# Patient Record
Sex: MALE | Race: OTHER | HISPANIC OR LATINO | Employment: FULL TIME | URBAN - METROPOLITAN AREA
[De-identification: names, ages, dates, MRNs, and addresses within clinical notes are randomized per-mention and may not be internally consistent; named-entity substitution may affect disease eponyms.]

---

## 2021-01-19 ENCOUNTER — HOSPITAL ENCOUNTER (EMERGENCY)
Facility: HOSPITAL | Age: 36
Discharge: HOME | End: 2021-01-19
Attending: EMERGENCY MEDICINE | Admitting: EMERGENCY MEDICINE
Payer: COMMERCIAL

## 2021-01-19 ENCOUNTER — APPOINTMENT (EMERGENCY)
Dept: RADIOLOGY | Facility: HOSPITAL | Age: 36
End: 2021-01-19
Payer: COMMERCIAL

## 2021-01-19 VITALS
WEIGHT: 280.87 LBS | TEMPERATURE: 97.8 F | SYSTOLIC BLOOD PRESSURE: 156 MMHG | HEART RATE: 68 BPM | OXYGEN SATURATION: 97 % | DIASTOLIC BLOOD PRESSURE: 97 MMHG | RESPIRATION RATE: 16 BRPM

## 2021-01-19 DIAGNOSIS — S93.409A ANKLE SPRAIN: Primary | ICD-10-CM

## 2021-01-19 PROCEDURE — 73610 X-RAY EXAM OF ANKLE: CPT

## 2021-01-19 PROCEDURE — 99282 EMERGENCY DEPT VISIT SF MDM: CPT | Performed by: EMERGENCY MEDICINE

## 2021-01-19 PROCEDURE — 99284 EMERGENCY DEPT VISIT MOD MDM: CPT

## 2021-01-19 RX ORDER — ACETAMINOPHEN 325 MG/1
975 TABLET ORAL ONCE
Status: COMPLETED | OUTPATIENT
Start: 2021-01-19 | End: 2021-01-19

## 2021-01-19 RX ORDER — LISINOPRIL 20 MG/1
20 TABLET ORAL DAILY
COMMUNITY
End: 2021-10-05 | Stop reason: SDUPTHER

## 2021-01-19 RX ADMIN — ACETAMINOPHEN 975 MG: 325 TABLET ORAL at 09:36

## 2021-01-19 NOTE — ED PROVIDER NOTES
History  Chief Complaint   Patient presents with    Ankle Pain     twisted left ankle this morning     31-year-old gentleman presents with complaint of left ankle pain  He states he was walking up onto a sidewalk and twisted his left ankle  He has been unable to bear weight secondary to pain  He denies any previous injuries to this ankle and has taken no medications for his pain symptoms  Denies any weakness or numbness  He also denies any injury secondary to falling after twisting his ankle  Ankle Pain  Location:  Ankle  Injury: yes    Mechanism of injury: fall    Fall:     Fall occurred:  Walking  Ankle location:  L ankle  Pain details:     Quality:  Aching and dull    Radiates to:  Does not radiate    Severity:  Severe    Onset quality:  Sudden    Duration: minutes  Timing:  Constant    Progression:  Unchanged  Chronicity:  New  Dislocation: no    Prior injury to area:  No  Relieved by:  Nothing  Worsened by:  Bearing weight  Ineffective treatments:  None tried  Associated symptoms: decreased ROM (due to pain) and stiffness    Associated symptoms: no back pain, no fatigue, no fever, no itching, no muscle weakness, no neck pain, no numbness, no swelling and no tingling        Prior to Admission Medications   Prescriptions Last Dose Informant Patient Reported? Taking?   lisinopril (ZESTRIL) 20 mg tablet   Yes Yes   Sig: Take 20 mg by mouth daily      Facility-Administered Medications: None       Past Medical History:   Diagnosis Date    Hypertension        Past Surgical History:   Procedure Laterality Date    VASECTOMY      2019       History reviewed  No pertinent family history  I have reviewed and agree with the history as documented      E-Cigarette/Vaping    E-Cigarette Use Never User      E-Cigarette/Vaping Substances     Social History     Tobacco Use    Smoking status: Never Smoker    Smokeless tobacco: Never Used   Substance Use Topics    Alcohol use: Never     Frequency: Never    Drug use: Never       Review of Systems   Constitutional: Negative  Negative for fatigue and fever  Respiratory: Negative  Gastrointestinal: Negative  Musculoskeletal: Positive for arthralgias (left ankle), gait problem and stiffness  Negative for back pain, joint swelling and neck pain  Skin: Negative for color change, itching and wound  Neurological: Negative for weakness  Hematological: Does not bruise/bleed easily  Physical Exam  Physical Exam  Vitals signs and nursing note reviewed  Constitutional:       General: She is in acute distress (appears uncomfortable)  Appearance: Normal appearance  She is well-developed  She is not ill-appearing or toxic-appearing  HENT:      Head: Normocephalic and atraumatic  Pulmonary:      Effort: Pulmonary effort is normal  No respiratory distress  Musculoskeletal:      Left ankle: Tenderness  Lateral malleolus and AITFL tenderness found  No medial malleolus, no head of 5th metatarsal and no proximal fibula tenderness found  Skin:     General: Skin is warm and dry  Capillary Refill: Capillary refill takes less than 2 seconds  Neurological:      General: No focal deficit present  Mental Status: She is alert  Sensory: No sensory deficit     Psychiatric:         Mood and Affect: Mood normal          Behavior: Behavior normal          Vital Signs  ED Triage Vitals [01/19/21 0912]   Temperature Pulse Respirations Blood Pressure SpO2   97 8 °F (36 6 °C) 78 18 (!) 174/115 100 %      Temp Source Heart Rate Source Patient Position - Orthostatic VS BP Location FiO2 (%)   Oral Monitor Sitting Left arm --      Pain Score       7           Vitals:    01/19/21 0912 01/19/21 1027   BP: (!) 174/115 156/97   Pulse: 78 68   Patient Position - Orthostatic VS: Sitting Lying         Visual Acuity      ED Medications  Medications   acetaminophen (TYLENOL) tablet 975 mg (975 mg Oral Given 1/19/21 0936)       Diagnostic Studies  Results Reviewed     None XR ankle 3+ views LEFT    (Results Pending)              Procedures  Procedures         ED Course                             SBIRT 22yo+      Most Recent Value   SBIRT (24 yo +)   In order to provide better care to our patients, we are screening all of our patients for alcohol and drug use  Would it be okay to ask you these screening questions? Yes Filed at: 01/19/2021 2936   Initial Alcohol Screen: US AUDIT-C    1  How often do you have a drink containing alcohol?  0 Filed at: 01/19/2021 0919   2  How many drinks containing alcohol do you have on a typical day you are drinking? 0 Filed at: 01/19/2021 0919   3a  Male UNDER 65: How often do you have five or more drinks on one occasion? 0 Filed at: 01/19/2021 0919   3b  FEMALE Any Age, or MALE 65+: How often do you have 4 or more drinks on one occassion? 0 Filed at: 01/19/2021 0919   Audit-C Score  0 Filed at: 01/19/2021 5987   ANNAMARIE: How many times in the past year have you    Used an illegal drug or used a prescription medication for non-medical reasons? Never Filed at: 01/19/2021 0747                    MDM    Disposition  Final diagnoses: Ankle sprain     Time reflects when diagnosis was documented in both MDM as applicable and the Disposition within this note     Time User Action Codes Description Comment    1/19/2021 10:21 AM Mimi Patel [[id number]] Ankle sprain       ED Disposition     ED Disposition Condition Date/Time Comment    Discharge Stable Tue Jan 19, 2021 10:21 AM Vidhya April discharge to home/self care              Follow-up Information     Follow up With Specialties Details Why Contact Info    Janes Landis DPM Podiatry, Wound Care  If symptoms worsen [address]  [address] [facility] [address]  [phone]            Discharge Medication List as of 1/19/2021 10:22 AM      CONTINUE these medications which have NOT CHANGED    Details   lisinopril (ZESTRIL) 20 mg tablet Take 20 mg by mouth daily, Historical Med           No discharge procedures on file      PDMP Review     None          ED Provider  Electronically Signed by           Raya Dorsey DO  01/19/21 8358

## 2021-01-19 NOTE — Clinical Note
Madhavi Luong was seen and treated in our emergency department on 1/19/2021  Diagnosis:     Ilya Nolan  may return to work on return date  He may return on this date: 01/21/2021         If you have any questions or concerns, please don't hesitate to call        Coleen Sarah DO    ______________________________           _______________          _______________  Hospital Representative                              Date                                Time

## 2021-07-30 ENCOUNTER — OFFICE VISIT (OUTPATIENT)
Dept: FAMILY MEDICINE CLINIC | Facility: CLINIC | Age: 36
End: 2021-07-30

## 2021-07-30 VITALS
TEMPERATURE: 96.9 F | DIASTOLIC BLOOD PRESSURE: 90 MMHG | HEART RATE: 86 BPM | RESPIRATION RATE: 16 BRPM | BODY MASS INDEX: 35.36 KG/M2 | WEIGHT: 247 LBS | OXYGEN SATURATION: 98 % | SYSTOLIC BLOOD PRESSURE: 130 MMHG | HEIGHT: 70 IN

## 2021-07-30 DIAGNOSIS — Z00.00 ANNUAL PHYSICAL EXAM: Primary | ICD-10-CM

## 2021-07-30 DIAGNOSIS — M54.50 ACUTE LEFT-SIDED LOW BACK PAIN WITHOUT SCIATICA: ICD-10-CM

## 2021-07-30 DIAGNOSIS — Z76.89 ENCOUNTER TO ESTABLISH CARE: ICD-10-CM

## 2021-07-30 PROCEDURE — 99385 PREV VISIT NEW AGE 18-39: CPT | Performed by: NURSE PRACTITIONER

## 2021-07-30 RX ORDER — CHLORTHALIDONE 25 MG/1
25 TABLET ORAL DAILY
COMMUNITY

## 2021-07-30 RX ORDER — TOPIRAMATE 25 MG/1
25 TABLET ORAL 2 TIMES DAILY
COMMUNITY
Start: 2021-07-08

## 2021-07-30 RX ORDER — PHENTERMINE HYDROCHLORIDE 37.5 MG/1
37.5 CAPSULE ORAL EVERY MORNING
COMMUNITY

## 2021-07-30 RX ORDER — CHOLECALCIFEROL (VITAMIN D3) 1250 MCG
1 CAPSULE ORAL WEEKLY
COMMUNITY
Start: 2021-04-29 | End: 2021-10-05

## 2021-07-30 NOTE — LETTER
July 30, 2021     Patient: Augusto Fernández   YOB: 1985   Date of Visit: 7/30/2021       To Whom it May Concern:    Augusto Fernández is under my professional care  He was seen in my office on 7/30/2021  He may return to work on 07/30/2021  If you have any questions or concerns, please don't hesitate to call           Sincerely,          PEDRO Brown        CC: No Recipients

## 2021-07-30 NOTE — PATIENT INSTRUCTIONS
Plan de alimentación con "enfoque dietético para detener la hipertensión (DASH, por hugo siglas en inglés)   LO QUE NECESITA SABER:   El plan de alimentación DASH está diseñado para ayudar a prevenir o disminuir la hipertensión  También puede ayudar a bajar el colesterol lamonte (colesterol LDL) y disminuir camp riesgo de enfermedad cardíaca  El plan es bajo en sodio, azúcar, grasas dañinas, y grasas en camp totalidad  Es alto en potasio, calcio, magnesio y [place]  Estos nutrientes se agregan al consumir más frutas, vegetales y granos enteros  INSTRUCCIONES SOBRE EL MAL HOSPITALARIA:   Camp límite de sodio cada día: Camp dietista le indicará la cantidad de sodio que usted debe consumir a diario  La gente que tiene la presión arterial mal debe consumir de 1,500 a 2,300 mg de sodio al día renea corry  Faraz cucharadita (cdta) de sal tiene 2,300 mg de sodio  [place] puede parecer renea faraz meta difícil, mattie pequeños cambios en los alimentos que usted consume pueden hacer faraz gran diferencia  Camp médico o dietista puede ayudarlo a crear un plan alimenticio que cumpla camp límite de sodio  Formas de limitar el sodio:  · Darby las etiquetas de los alimentos  Las etiquetas pueden ayudarle a escoger alimentos bajos en sodio  La cantidad de sodio está incluida en miligramos (mg)  La columna del porcentaje de valor diario indica la cantidad de necesidades diarias satisfechas con 1 porción del alimento para cada nutriente en la lista  Escoja alimentos que tengan menos de 5% del porcentaje diario de Herbert  Estos alimentos se consideran bajos en sodio  Los alimentos que tienen 20% o más del porcentaje diario de sodio se consideran alimentos altos en sodio  Evite alimentos que tengan más de 300 mg de sodio por porción  Escoja alimentos Derick Gist diga que son bajos en sodio, con sodio reducido, o sin sal agregada           · Evite la sal  No le agregue sal a la comida cuando se siente a la ramirez a comer, y agregue muy poca sal a los alimentos fabio la cocción  Use hierbas y condimentos, renea cebollas, ajo y especias sin sal para agregar sabor a los alimentos  Use jugo de lima, kalyan o vinagre para darle a los alimentos un sabor ácido  Use chiles picantes o faraz cantidad pequeña de salsa picante para agregar un sabor picante a los alimentos  · Pregunte sobre los sustitutos para la sal  Pregúntele a camp médico si es posible usar sustitutos de la sal  Algunos sustitutos de la sal vienen con ingredientes que pueden ser dañinos si usted tiene ciertos padecimientos médicos  · Escoja los alimentos cuidadosamente cuando sale a comer a restaurantes: las comidas de los restaurantes, sobre todo restaurantes de comida rápida, rick siempre son altas en sodio  Algunos restaurantes ofrecen información nutricional que indica la cantidad de sodio en hugo alimentos  Pida que preparen hugo comidas con menos sal o sin sal     Lo que necesita saber acerca de las grasas:  · Incluya grasas saludables  Las grasas insaturadas y los ácidos grasos omega-3 son ejemplos de grasas saludables  Las grasas insaturadas se encuentran en los aceites de soja, can[place], [place] o [place] y la margarina [place]ier[place]re  y [facility]  Los ácidos grasos [place] 3 se encuentran en el pescado con grasa, renea el salmón, el atún, la caballa y las cindy  También se le puede encontrar en el aceita de linaza y en la linaza molida  · Evite las grasas insalubres  No consuma grasas dañinas, renea grasas saturadas y grasas trans  Las grasas saturadas se encuentran en alimentos que contienen grasa animal  Jesusita Polka son Merla Han grasosas, la [place], la [facility], la crema y otros productos lácteos  Además se pueden encontrar en la [place], la margarina en millie, el aceite de gonazles y el aceite de rosita  Las grasas trans se encuentran en comidas fritas, galletas estilo soda, tortillitas tostadas, y alimentos horneados hechos con Joseph Barter      Alimentos que puede incluir: Con el plan de alimentación DASH, usted necesita consumir un número específico de porciones de cada zoey de alimentos  Datto le ayudará a consumir las cantidades suficientes de ciertos nutrientes y limitar otros  La cantidad de porciones que usted debe comer depende de la cantidad de calorías que usted necesita  Basilio dietista le informará sobre cuántas calorías necesita usted  El número de porciones que viene en la lista al lado de los grupos alimenticios a continuación es para las personas que necesitan aproximadamente 2,000 calorías al día  · Granos: 6 a 8 porciones (3 de estas porciones deben ser de alimentos de granos enteros o integrales)    ? 1 tajada de pan integral    ? 1 onza de cereal seco    ? ½ taza de cereal cocinado, pasta, o arroz integral    · Verduras y frutas: 4 a 5 porciones de frutas y 4 a 5 porciones de vegetales    ? 1 fruta mediana    ? ½ taza de vegetales congelados, enlatados (sin sal adicional), o vegetales frescos y picados    ? ½ taza de fruta fresca, congelada, seca o enlatada (enlatada en sirope liviano o jugo de fruta)    ? ½ taza de jugo de verduras o frutas    · Productos lácteos: 2 a 3 porciones    ? [address] 1%    ? 1½ onzas de queso descremado o bajo en grasas    ? 6 onzas de yogurt descremado o bajo en grasas    · Bryson barber, bryson meghan y pescado magros: 6 onzas o menos    ? Bryson meghan (debbi, pavo) sin piel    ? Pescado (sobre todo pescado con grasa, renea salmón, atún fresco o BATRES)    ? Carne de res o de cerdo magra (Yareli Care extra [place])    ? Claras de huevo y sustitutos del huevo    · [place] du sac, semillas y legumbres: 4 a 5 porciones por semana    ? ½ taza de frijoles y arvejas cocinadas    ? 1½ onzas de nueces sin sal    ? 2 cucharadas de mantequilla de maní o semillas    · Dulces y azúcares adicionales: 5 o menos por semana    ? 1 cucharada de azúcar, jalea o mermelada    ? ½ taza de sorbeto o gelatina    ?  1 taza de limonada    · Grasas: 2 a 3 porciones por semana    ? 1 cucharadita de margarina suave o aceite vegetal    ? 1 cucharada de mayonesa    ? 2 cucharadas de aderezo para [facility] [employer] se deben evitar:  · Granos:     ? Postres horneados o fritos renea donas, pastelitos, galletas, y quequitos (altos en grasas y azúcar)    ? Mezclas para hacer pan de maíz y pasteles, alimentos empacados, renea rellenos para pavo, mezclas para hacer arroz y pasta, macarrones con Priyank-barre, y cereales instantáneos (altos en sodio)    · Rodrick Fairly y verduras:     ? Vegetales regulares enlatados (altos en sodio)    ? Repollo preparado en vinagre, vegetales en vinagre, y otros alimentos preparados en escabeche (altos en sodio)    ? Vegetales fritos o vegetales en mantequilla o salsas altas en grasas    ? [place] Fairly en crema o salsa de mantequilla (altas en grasas)    · Productos lácteos:     ? Leche entera, leche semi descremada (2%), y crema (abdon en grasas)    ? Queso regular y Priyank-barre procesado (alto en grasa y Herbert)    · Bryson y alimentos con proteínas:     ? Bryson ahumadas o curadas, renea carne de jadiel en conserva, tocino, jamón, perros calientes, y salchicha (abdon en grasas y [place])    ? Frijoles enlatados y bryson enlatadas o bryson en pasta, renea bryson en conserva, cindy, anchoas y mariscos de imitación (altos en sodio)    ? Bryson para emparedados, renea [place], [place], [place], y carne de res en rebanada (altos en sodio)    ? Bryson altas en grasas (biste estilo T-bone, carne molida para hamburguesas, costillas)    ? Huevos enteros y yemas de huevo (altos en grasas)    · Otros:     ? Condimentos hechos con sal, renea sal de ajo, sal de apio, sal de cebolla, sal condimentada, suavizantes para bryson, y glutamato de monosodio (MSG, por hugo siglas en inglés)    ? Sopa Miso y mezclas para sopa enlatadas o secas (altas en sodio)    ?  Salsa de soya regular, salsa de [address] [facility], salsa [place], salsa para biste, [address], y [address] con sabor (altas en sodio)    ? Condimentos regulares, renea mostaza, salsa de tomate y aderezos para ensalada (altos en sodio)    ? Salsa para ann y salsas en general, renea salsa Daniel o de queso (altas en sodio y [place])    ? Bebidas altas en azúcar, renea bebidas gaseosas o jugos de frutas    ? Alimentos para merendar, renea casie tostadas, palomitas de [place], pretzels, piel de cerdo, galletas de soda saladas, y nueces saladas    ? Alimentos congelados, renea cenas preparadas, platos principales, vegetales con salsas, y ann cubiertas en pan (altas en sodio)    Otras pautas que debe seguir:  · Mantenga un peso saludable  Camp riesgo de enfermedad cardíaca es aún más alto si usted tiene sobrepeso  Camp médico podría sugerirle que adelgace si tiene sobrepeso  Usted puede perder peso si se propone consumir menos calorías y alimentos que tengan azúcar y grasas agregadas  El plan de alimentación DASH puede ayudarle a lograrlo  [place] buena forma de disminuir el consumo de calorías es consumiendo porciones más pequeñas en cada comida y menos meriendas entre comidas  Consulte a acmp médico para obtener más información sobre cómo adelgazar  · Realice actividad física con regularidad  El ejercicio regular puede ayudarle a alcanzar o mantener un peso saludable  El ejercicio regular también puede ayudarle a disminuir camp presión arterial y mejorar hugo niveles de colesterol  Tevin ejercicios moderados por 30 minutos o más todos los días de la [place]  Para bajar peso, asegúrese de ejercitarse por lo menos 60 minutos  Consulte con camp médico sobre un programa de ejercicio adecuado para usted  · Limite el consumo de alcohol  Las mujeres deberían limitar el consumo de alcohol a 1 bebida por día  Los hombres deberían limitar el consumo de alcohol a 2 tragos al día  Un trago equivale a 12 onzas de cerveza, 5 onzas de vino o 1 onza y ½ de licor      © Copyright [employer] 2021 Information is for End User's use only and may not be sold, redistributed or otherwise used for commercial purposes  All illustrations and images included in CareNotes® are the copyrighted property of [employer]  or [address] [place] [address] es sólo para uso en educación  Camp intención no es darle un consejo médico sobre enfermedades o tratamientos  Colsulte con camp Ana Cables farmacéutico antes de seguir cualquier régimen médico para saber si es seguro y efectivo para usted  Wellness Visit for Adults   AMBULATORY CARE:   A wellness visit  is when you see your healthcare provider to get screened for health problems  Your healthcare provider will also give you advice on how to stay healthy  Write down your questions so you remember to ask them  Ask your healthcare provider how often you should have a wellness visit  What happens at a wellness visit:  Your healthcare provider will ask about your health, and your family history of health problems  This includes high blood pressure, heart disease, and cancer  He or she will ask if you have symptoms that concern you, if you smoke, and about your mood  You may also be asked about your intake of medicines, supplements, food, and alcohol  Any of the following may be done:  · Your weight  will be checked  Your height may also be checked so your body mass index (BMI) can be calculated  Your BMI shows if you are at a healthy weight  · Your blood pressure  and heart rate will be checked  Your temperature may also be checked  · Blood and urine tests  may be done  Blood tests may be done to check your cholesterol levels  Abnormal cholesterol levels increase your risk for heart disease and stroke  You may also need a blood or urine test to check for diabetes if you are at increased risk  Urine tests may be done to look for signs of an infection or kidney disease  · A physical exam  includes checking your heartbeat and lungs with a stethoscope   Your healthcare provider may also check your skin to look for sun damage  · Screening tests  may be recommended  A screening test is done to check for diseases that may not cause symptoms  The screening tests you may need depend on your age, gender, family history, and lifestyle habits  For example, colorectal screening may be recommended if you are 48years old or older  Screening tests you need if you are a woman:   · A Pap smear  is used to screen for cervical cancer  Pap smears are usually done every 3 to 5 years depending on your age  You may need them more often if you have had abnormal Pap smear test results in the past  Ask your healthcare provider how often you should have a Pap smear  · A mammogram  is an x-ray of your breasts to screen for breast cancer  Experts recommend mammograms every 2 years starting at age 48 years  You may need a mammogram at age 52 years or younger if you have an increased risk for breast cancer  Talk to your healthcare provider about when you should start having mammograms and how often you need them  Vaccines you may need:   · Get an influenza vaccine  every year  The influenza vaccine protects you from the flu  Several types of viruses cause the flu  The viruses change over time, so new vaccines are made each year  · Get a tetanus-diphtheria (Td) booster vaccine  every 10 years  This vaccine protects you against tetanus and diphtheria  Tetanus is a severe infection that may cause painful muscle spasms and lockjaw  Diphtheria is a severe bacterial infection that causes a thick covering in the back of your mouth and throat  · Get a human papillomavirus (HPV) vaccine  if you are female and aged 23 to 32 or male 23 to 24 and never received it  This vaccine protects you from HPV infection  HPV is the most common infection spread by sexual contact  HPV may also cause vaginal, penile, and anal cancers  · Get a pneumococcal vaccine  if you are aged 72 years or older   The pneumococcal vaccine is an injection given to protect you from pneumococcal disease  Pneumococcal disease is an infection caused by pneumococcal bacteria  The infection may cause pneumonia, meningitis, or an ear infection  · Get a shingles vaccine  if you are 60 or older, even if you have had shingles before  The shingles vaccine is an injection to protect you from the varicella-zoster virus  This is the same virus that causes chickenpox  Shingles is a painful rash that develops in people who had chickenpox or have been exposed to the virus  How to eat healthy:  My Plate is a model for planning healthy meals  It shows the types and amounts of foods that should go on your plate  Fruits and vegetables make up about half of your plate, and grains and protein make up the other half  A serving of dairy is included on the side of your plate  The amount of calories and serving sizes you need depends on your age, gender, weight, and height  Examples of healthy foods are listed below:  · Eat a variety of vegetables  such as dark green, red, and orange vegetables  You can also include canned vegetables low in sodium (salt) and frozen vegetables without added butter or sauces  · Eat a variety of fresh fruits , canned fruit in 100% juice, frozen fruit, and dried fruit  · Include whole grains  At least half of the grains you eat should be whole grains  Examples include whole-wheat bread, wheat pasta, brown rice, and whole-grain cereals such as oatmeal     · Eat a variety of protein foods such as seafood (fish and shellfish), lean meat, and poultry without skin (turkey and chicken)  Examples of lean meats include pork leg, shoulder, or tenderloin, and beef round, sirloin, tenderloin, and extra lean ground beef  Other protein foods include eggs and egg substitutes, beans, peas, soy products, nuts, and seeds  · Choose low-fat dairy products such as skim or 1% milk or low-fat yogurt, cheese, and cottage cheese      · Limit unhealthy fats  such as butter, hard margarine, and shortening  Exercise:  Exercise at least 30 minutes per day on most days of the week  Some examples of exercise include walking, biking, dancing, and swimming  You can also fit in more physical activity by taking the stairs instead of the elevator or parking farther away from stores  Include muscle strengthening activities 2 days each week  Regular exercise provides many health benefits  It helps you manage your weight, and decreases your risk for type 2 diabetes, heart disease, stroke, and high blood pressure  Exercise can also help improve your mood  Ask your healthcare provider about the best exercise plan for you  General health and safety guidelines:   · Do not smoke  Nicotine and other chemicals in cigarettes and cigars can cause lung damage  Ask your healthcare provider for information if you currently smoke and need help to quit  E-cigarettes or smokeless tobacco still contain nicotine  Talk to your healthcare provider before you use these products  · Limit alcohol  A drink of alcohol is 12 ounces of beer, 5 ounces of wine, or 1½ ounces of liquor  · Lose weight, if needed  Being overweight increases your risk of certain health conditions  These include heart disease, high blood pressure, type 2 diabetes, and certain types of cancer  · Protect your skin  Do not sunbathe or use tanning beds  Use sunscreen with a SPF 15 or higher  Apply sunscreen at least 15 minutes before you go outside  Reapply sunscreen every 2 hours  Wear protective clothing, hats, and sunglasses when you are outside  · Drive safely  Always wear your seatbelt  Make sure everyone in your car wears a seatbelt  A seatbelt can save your life if you are in an accident  Do not use your cell phone when you are driving  This could distract you and cause an accident  Pull over if you need to make a call or send a text message  · Practice safe sex    Use latex condoms if are sexually active and have more than one partner  Your healthcare provider may recommend screening tests for sexually transmitted infections (STIs)  · Wear helmets, lifejackets, and protective gear  Always wear a helmet when you ride a bike or motorcycle, go skiing, or play sports that could cause a head injury  Wear protective equipment when you play sports  Wear a lifejacket when you are on a boat or doing water sports  © Copyright [employer] 2021 Information is for End User's use only and may not be sold, redistributed or otherwise used for commercial purposes  All illustrations and images included in CareNotes® are the copyrighted property of [employer] D A M [employer] Inc  or [employer]   The above information is an  only  It is not intended as medical advice for individual conditions or treatments  Talk to your doctor, nurse or pharmacist before following any medical regimen to see if it is safe and effective for you  Cholesterol and Your Health   AMBULATORY CARE:   Cholesterol  is a waxy, fat-like substance  Your body uses cholesterol to make hormones and new cells, and to protect nerves  Cholesterol is made by your body  It also comes from certain foods you eat, such as meat and dairy products  Your healthcare provider can help you set goals for your cholesterol levels  He or she can help you create a plan to meet your goals  Cholesterol level goals: Your cholesterol level goals depend on your risk for heart disease, your age, and your other health conditions  The following are general guidelines:  · Total cholesterol  includes low-density lipoprotein (LDL), high-density lipoprotein (HDL), and triglyceride levels  The total cholesterol level should be lower than 200 mg/dL and is best at about 150 mg/dL  · LDL cholesterol  is called bad cholesterol  because it forms plaque in your arteries  As plaque builds up, your arteries become narrow, and less blood flows through   When plaque decreases blood flow to your heart, you may have chest pain  If plaque completely blocks an artery that brings blood to your heart, you may have a heart attack  Plaque can break off and form blood clots  Blood clots may block arteries in your brain and cause a stroke  The level should be less than 130 mg/dL and is best at about 100 mg/dL  · HDL cholesterol  is called good cholesterol  because it helps remove LDL cholesterol from your arteries  It does this by attaching to LDL cholesterol and carrying it to your liver  Your liver breaks down LDL cholesterol so your body can get rid of it  High levels of HDL cholesterol can help prevent a heart attack and stroke  Low levels of HDL cholesterol can increase your risk for heart disease, heart attack, and stroke  The level should be 60 mg/dL or higher  · Triglycerides  are a type of fat that store energy from foods you eat  High levels of triglycerides also cause plaque buildup  This can increase your risk for a heart attack or stroke  If your triglyceride level is high, your LDL cholesterol level may also be high  The level should be less than 150 mg/dL  Any of the following can increase your risk for high cholesterol:   · Smoking cigarettes    · Being overweight or obese, or not getting enough exercise    · Drinking large amounts of alcohol    · A medical condition such as hypertension (high blood pressure) or diabetes    · Certain genes passed from your parents to you    · Age older than 65 years    What you need to know about having your cholesterol levels checked: Adults 21to 39years of age should have their cholesterol levels checked every 4 to 6 years  Adults 45 years or older should have their cholesterol checked every 1 to 2 years  You may need your cholesterol checked more often, or at a younger age, if you have risk factors for heart disease  You may also need to have your cholesterol checked more often if you have other health conditions, such as diabetes   Blood tests are used to check cholesterol levels  Blood tests measure your levels of triglycerides, LDL cholesterol, and HDL cholesterol  How healthy fats affect your cholesterol levels:  Healthy fats, also called unsaturated fats, help lower LDL cholesterol and triglyceride levels  Healthy fats include the following:  · Monounsaturated fats  are found in foods such as olive oil, canola oil, avocado, nuts, and olives  · Polyunsaturated fats,  such as omega 3 fats, are found in fish, such as salmon, trout, and tuna  They can also be found in plant foods such as flaxseed, walnuts, and soybeans  How unhealthy fats affect your cholesterol levels:  Unhealthy fats increase LDL cholesterol and triglyceride levels  They are found in foods high in cholesterol, saturated fat, and trans fat:  · Cholesterol  is found in eggs, dairy, and meat  · Saturated fat  is found in butter, cheese, ice cream, whole milk, and coconut oil  Saturated fat is also found in meat, such as sausage, hot dogs, and bologna  · Trans fat  is found in liquid oils and is used in fried and baked foods  Foods that contain trans fats include chips, crackers, muffins, sweet rolls, microwave popcorn, and cookies  Treatment  for high cholesterol will also decrease your risk of heart disease, heart attack, and stroke  Treatment may include any of the following:  · Lifestyle changes  may include food, exercise, weight loss, and quitting smoking  You may also need to decrease the amount of alcohol you drink  Your healthcare provider will want you to start with lifestyle changes  Other treatment may be added if lifestyle changes are not enough  Your healthcare provider may recommend you work with a team to manage hyperlipidemia  The team may include medical experts such as a dietitian, an exercise or physical therapist, and a behavior therapist  Your family members may be included in helping you create lifestyle changes      · Medicines  may be given to lower your LDL cholesterol, triglyceride levels, or total cholesterol level  You may need medicines to lower your cholesterol if any of the following is true:    ? You have a history of stroke, TIA, unstable angina, or a heart attack  ? Your LDL cholesterol level is 190 mg/dL or higher  ? You are age 36 to 76 years, have diabetes or heart disease risk factors, and your LDL cholesterol is 70 mg/dL or higher  · Supplements  include fish oil, red yeast rice, and garlic  Fish oil may help lower your triglyceride and LDL cholesterol levels  It may also increase your HDL cholesterol level  Red yeast rice may help decrease your total cholesterol level and LDL cholesterol level  Garlic may help lower your total cholesterol level  Do not take any supplements without talking to your healthcare provider  Food changes you can make to lower your cholesterol levels:  A dietitian can help you create a healthy eating plan  He or she can show you how to read food labels and choose foods low in saturated fat, trans fats, and cholesterol  · Decrease the total amount of fat you eat  Choose lean meats, fat-free or 1% fat milk, and low-fat dairy products, such as yogurt and cheese  Try to limit or avoid red meats  Limit or do not eat fried foods or baked goods, such as cookies  · Replace unhealthy fats with healthy fats  Cook foods in olive oil or canola oil  Choose soft margarines that are low in saturated fat and trans fat  Seeds, nuts, and avocados are other examples of healthy fats  · Eat foods with omega-3 fats  Examples include salmon, tuna, mackerel, walnuts, and flaxseed  Eat fish 2 times per week  Pregnant women should not eat fish that have high levels of mercury, such as shark, swordfish, and kulwant mackerel  · Increase the amount of high-fiber foods you eat  High-fiber foods can help lower your LDL cholesterol  Aim to get between 20 and 30 grams of fiber each day  Fruits and vegetables are high in fiber   Eat at least 5 servings each day  Other high-fiber foods are whole-grain or whole-wheat breads, pastas, or cereals, and brown rice  Eat 3 ounces of whole-grain foods each day  Increase fiber slowly  You may have abdominal discomfort, bloating, and gas if you add fiber to your diet too quickly  · Eat healthy protein foods  Examples include low-fat dairy products, skinless chicken and turkey, fish, and nuts  · Limit foods and drinks that are high in sugar  Your dietitian or healthcare provider can help you create daily limits for high-sugar foods and drinks  The limit may be lower if you have diabetes or another health condition  Limits can also help you lose weight if needed  Lifestyle changes you can make to lower your cholesterol levels:   · Maintain a healthy weight  Ask your healthcare provider what a healthy weight is for you  Ask him or her to help you create a weight loss plan if needed  Weight loss can decrease your total cholesterol and triglyceride levels  Weight loss may also help keep your blood pressure at a healthy level  · Be physically active throughout the day  Physical activity, such as exercise, can help lower your total cholesterol level and maintain a healthy weight  Physical activity can also help increase your HDL cholesterol level  Work with your healthcare provider to create an program that is right for you  Get at least 30 to 40 minutes of moderate physical activity most days of the week  Examples of exercise include brisk walking, swimming, or biking  Also include strength training at least 2 times each week  Your healthcare providers can help you create a physical activity plan  · Do not smoke  Nicotine and other chemicals in cigarettes and cigars can raise your cholesterol levels  Ask your healthcare provider for information if you currently smoke and need help to quit  E-cigarettes or smokeless tobacco still contain nicotine   Talk to your healthcare provider before you use these products  · Limit or do not drink alcohol  Alcohol can increase your triglyceride levels  Ask your healthcare provider before you drink alcohol  Ask how much is okay for you to drink in 24 hours or 1 week  Follow up with your doctor as directed:  Write down your questions so you remember to ask them during your visits  © Copyright [employer] 2021 Information is for End User's use only and may not be sold, redistributed or otherwise used for commercial purposes  All illustrations and images included in CareNotes® are the copyrighted property of [employer]  or Be Hodges   The above information is an  only  It is not intended as medical advice for individual conditions or treatments  Talk to your doctor, nurse or pharmacist before following any medical regimen to see if it is safe and effective for you  Dieta con alto contenido de Prole   LO QUE NECESITA SABER:   Aureliano Six dieta con alto contenido de fibra incluye alimentos con faraz mal cantidad de fibra  La fibra es la parte de las frutas, los vegetales y los granos, que no se descompone al ser ingerida por camp cuerpo  La fibra ayuda a regular las evacuaciones intestinales  La fibra además ayuda a bajar el colesterol, controlar la glucosa en la johana en las personas que sufren de diabetes y para aliviar el estreñimiento  También la fibra podría ayudarle a controlar camp peso debido a que le da la sensación de llenura más rápidamente  La mayoría de los adultos deberían consumir entre 25 a 35 gramos de fibra al día  Consulte con camp dietista o médico sobre la cantidad de fibra que usted necesita  INSTRUCCIONES SOBRE EL MAL HOSPITALARIA:   Buenas severino de fibra:      · Alimentos que contienen al menos 4 gramos de fibra por porción:     ? ? a ½ de faraz taza de cereal con alto contenido de fibra (rodriguez la etiqueta nutricional en la caja)    ? ½ taza de moras o frambuesas    ? 4 ciruelas pasas    ?  1 alcachofa cocida    ? ½ taza de legumbres cocidas, renea lentejas o frijoles rojos o pintos    · [employer] contienen 1 a 3 gramos de [place] por porción:     ? 1 rebanada de pan de evon integral, pan integral de galvez o pan de galvez    ? ½ taza de arroz integral cocido    ? 4 galletas integrales    ? 1 taza de harjit    ? ½ taza de cereal con 1 a 3 gramos de fibra por porción (rodriguez la etiqueta nutricional en la caja)    ? 1 porción pequeña de fruta renea manzana, banana, viral, kiwi o naranja    ? 3 dátiles    ? ½ taza de albaricoques enlatados, ensalada de frutas, duraznos o peras    ? ½ taza de verduras crudas o cocidas, renea zanahorias, coliflor, repollo, espinaca, calabaza o maíz  Formas en que puede aumentar la fibra en camp dieta:  · Escoja arroz integral o andreina en vez de arroz hernandez     · Use harina de grano integral en las recetas en vez de harina richard  · Berdine Sparrow y arvejas a los guisos o las sopas  · Dorena Paris y verduras frescas con la cascara en vez de jugos  Otras pautas dietéticas que debe seguir:  · Ann Bushy a camp dieta lentamente  Usted podría presentar malestar o inflamación estomacal y gases si añade fibra a camp dieta demasiado rápido  · Sophia mucho líquido a medida que agrega fibra a camp dieta  Es posible que tenga náuseas o desarrolle estreñimiento si no yusef suficiente agua  Pregunte cuánto líquido debe ana cada día y cuáles líquidos son los más adecuados para usted  © Copyright [employer] 2021 Information is for End User's use only and may not be sold, redistributed or otherwise used for commercial purposes  All illustrations and images included in CareNotes® are the copyrighted property of [employer]  or [address] [place] [address] es sólo para uso en educación  Camp intención no es darle un consejo médico sobre enfermedades o tratamientos   Colsulte con camp [place] farmacéutico antes de seguir cualquier régimen médico para saber si es seguro y efectivo para usted

## 2021-07-30 NOTE — ASSESSMENT & PLAN NOTE
BMI above normal  Counseling and plan as documented below   -Pt acknowledges understanding and agrees to lifestyle changes   -Additional handouts on diet/exercise provided   -Will monitor progress at next scheduled follow-up  -  Continue weight loss

## 2021-08-02 ENCOUNTER — APPOINTMENT (OUTPATIENT)
Dept: LAB | Facility: CLINIC | Age: 36
End: 2021-08-02
Payer: COMMERCIAL

## 2021-08-02 LAB
25(OH)D3 SERPL-MCNC: 24.6 NG/ML (ref 30–100)
ALBUMIN SERPL BCP-MCNC: 4 G/DL (ref 3.5–5)
ALP SERPL-CCNC: 79 U/L (ref 46–116)
ALT SERPL W P-5'-P-CCNC: 25 U/L (ref 12–78)
ANION GAP SERPL CALCULATED.3IONS-SCNC: 7 MMOL/L (ref 4–13)
AST SERPL W P-5'-P-CCNC: 12 U/L (ref 5–45)
BILIRUB SERPL-MCNC: 0.31 MG/DL (ref 0.2–1)
BUN SERPL-MCNC: 13 MG/DL (ref 5–25)
CALCIUM SERPL-MCNC: 9 MG/DL (ref 8.3–10.1)
CHLORIDE SERPL-SCNC: 107 MMOL/L (ref 100–108)
CHOLEST SERPL-MCNC: 129 MG/DL (ref 50–200)
CO2 SERPL-SCNC: 25 MMOL/L (ref 21–32)
CREAT SERPL-MCNC: 1.01 MG/DL (ref 0.6–1.3)
ERYTHROCYTE [DISTWIDTH] IN BLOOD BY AUTOMATED COUNT: 13.2 % (ref 11.6–15.1)
GFR SERPL CREATININE-BSD FRML MDRD: 97 ML/MIN/1.73SQ M
GLUCOSE P FAST SERPL-MCNC: 85 MG/DL (ref 65–99)
HCT VFR BLD AUTO: 46.8 % (ref 36.5–49.3)
HDLC SERPL-MCNC: 38 MG/DL
HGB BLD-MCNC: 15.1 G/DL (ref 12–17)
LDLC SERPL CALC-MCNC: 83 MG/DL (ref 0–100)
MCH RBC QN AUTO: 28.4 PG (ref 26.8–34.3)
MCHC RBC AUTO-ENTMCNC: 32.3 G/DL (ref 31.4–37.4)
MCV RBC AUTO: 88 FL (ref 82–98)
NONHDLC SERPL-MCNC: 91 MG/DL
PLATELET # BLD AUTO: 335 THOUSANDS/UL (ref 149–390)
PMV BLD AUTO: 9.9 FL (ref 8.9–12.7)
POTASSIUM SERPL-SCNC: 4.2 MMOL/L (ref 3.5–5.3)
PROT SERPL-MCNC: 8.1 G/DL (ref 6.4–8.2)
RBC # BLD AUTO: 5.31 MILLION/UL (ref 3.88–5.62)
SODIUM SERPL-SCNC: 139 MMOL/L (ref 136–145)
TRIGL SERPL-MCNC: 42 MG/DL
WBC # BLD AUTO: 8.38 THOUSAND/UL (ref 4.31–10.16)

## 2021-08-02 PROCEDURE — 80061 LIPID PANEL: CPT

## 2021-08-02 PROCEDURE — 82306 VITAMIN D 25 HYDROXY: CPT

## 2021-08-02 PROCEDURE — 85027 COMPLETE CBC AUTOMATED: CPT

## 2021-08-02 PROCEDURE — 80053 COMPREHEN METABOLIC PANEL: CPT

## 2021-08-02 PROCEDURE — 36415 COLL VENOUS BLD VENIPUNCTURE: CPT

## 2021-08-04 ENCOUNTER — NURSE TRIAGE (OUTPATIENT)
Dept: PHYSICAL THERAPY | Facility: OTHER | Age: 36
End: 2021-08-04

## 2021-08-04 DIAGNOSIS — G89.29 ACUTE EXACERBATION OF CHRONIC LOW BACK PAIN: Primary | ICD-10-CM

## 2021-08-04 DIAGNOSIS — M54.50 ACUTE EXACERBATION OF CHRONIC LOW BACK PAIN: Primary | ICD-10-CM

## 2021-08-04 NOTE — TELEPHONE ENCOUNTER
Additional Information   Negative: Is this related to a work injury?  Negative: Is this related to an MVA?  Negative: Are you currently recieving homecare services?  Negative: Has the patient had unexplained weight loss?  Negative: Does the patient have a fever?  Negative: Is the patient experiencing blood in sputum?  Negative: Is the patient experiencing urine retention?  Negative: Is the patient experiencing acute drop foot or paralysis?  Negative: Has the patient experienced major trauma? (fall from height, high speed collision, direct blow to spine) and is also experiencing nausea, light-headedness, or loss of consciousness?  Negative: Is this a chronic condition? 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  States the back pain has been worse over the last 2 weeks  Date of onset: 2 weeks  Frequency of pain: intermittent  Quality of pain: aching "nerve pinching like pain"    Protocols used: SL AMB COMPREHENSIVE SPINE PROGRAM PROTOCOL    This RN did review in detail the Comprehensive Spine Program and what we can provide for their back pain  Patient is agreeable to being triaged by this RN and would like to proceed with Physical Therapy  Referral was placed for Physical Therapy at the [facility] site  Patients information was sent to the  to make evaluation appointment  Patient made aware that the PT office  will be calling to schedule the appointment  Patient was provided with the phone number to the PT office  No further questions and/or concerns were voiced by the patient at this time  Patient states understanding of the referral that was placed  Referral Closed

## 2021-08-10 ENCOUNTER — EVALUATION (OUTPATIENT)
Dept: PHYSICAL THERAPY | Facility: CLINIC | Age: 36
End: 2021-08-10
Payer: COMMERCIAL

## 2021-08-10 VITALS — TEMPERATURE: 99.4 F | HEART RATE: 69 BPM | DIASTOLIC BLOOD PRESSURE: 118 MMHG | SYSTOLIC BLOOD PRESSURE: 178 MMHG

## 2021-08-10 DIAGNOSIS — G89.29 ACUTE EXACERBATION OF CHRONIC LOW BACK PAIN: ICD-10-CM

## 2021-08-10 DIAGNOSIS — M54.50 ACUTE EXACERBATION OF CHRONIC LOW BACK PAIN: ICD-10-CM

## 2021-08-10 PROCEDURE — 97161 PT EVAL LOW COMPLEX 20 MIN: CPT | Performed by: PHYSICAL THERAPIST

## 2021-08-10 NOTE — PROGRESS NOTES
PT Evaluation     Today's date: 8/10/2021  Patient name: Vilma Harada  : 1985  MRN: [id number]  Referring provider: Karely Leigh PT  Dx:   Encounter Diagnosis     ICD-10-CM    1  Acute exacerbation of chronic low back pain  M54 5 Ambulatory referral to PT spine    G89 29                   Assessment  Assessment details: Pt is a 31y o  year old male presenting to physical therapy for Acute exacerbation of chronic low back pain  Pt presents to therapy via Comp Spine program and is Moderate Risk on Start Back Assessment Tool  He presents with the following impairments: decreased L/S ROM, decreased L LE strength, hypermobility of L/S spine, poor multifidi activation b/l, TTP of L paraspinals and erector spinae, and + Prone Instability test affecting his function with squatting, lifting, bending forward/backward, and practicing karate  Pt will benefit from stability program with strengthening exercises for LE  Pt will benefit from skilled physical therapy to address functional limitations noted in evaluation and meet patient goals  Impairments: abnormal muscle firing, abnormal or restricted ROM, activity intolerance, impaired physical strength, lacks appropriate home exercise program, pain with function and poor body mechanics    Symptom irritability: moderateUnderstanding of Dx/Px/POC: good   Prognosis: good    Goals  ST  Pt will decrease pain while lifting from 8/10 to 2/10  2  Pt will improve L/S extension ROM from mod to min deficit  3  Pt will improve b/l multifidi activation from poor to good  LT  Pt will perform 20 squats with proper form and without pain to improve lifting ability  2  Pt will increase L knee extension strength from 4/5 to 4+/5 to improve squat mechanics  3  Pt will be Independent with HEP      Plan  Patient would benefit from: PT eval and skilled physical therapy  Planned modality interventions: biofeedback, manual electrical stimulation, microcurrent electrical stimulation, TENS, electrical stimulation/Russian stimulation, thermotherapy: hydrocollator packs, cryotherapy and unattended electrical stimulation  Planned therapy interventions: abdominal trunk stabilization, joint mobilization, manual therapy, massage, ADL retraining, neuromuscular re-education, body mechanics training, patient education, postural training, strengthening, stretching, therapeutic activities, therapeutic exercise, flexibility, functional ROM exercises and home exercise program  Frequency: 2x week  Duration in weeks: 4  Treatment plan discussed with: patient        Subjective Evaluation    History of Present Illness  Mechanism of injury: Pt presents to therapy with low back pain that started 15 years ago after moving heavy furniture  The pain was exacerbated a couple months ago when he was pulling his son  Pt reports that he has difficulty and pain with lifting heavy objects and twisting  Pt reports no numbness or tingling into the legs, no pain at night, and no changes to bowel or bladder function  Quality of life: good    Pain  Current pain ratin  At worst pain ratin  Quality: pulling, discomfort, tight and sharp  Aggravating factors: lifting      Diagnostic Tests  No diagnostic tests performed  Treatments  Current treatment: physical therapy  Patient Goals  Patient goals for therapy: decreased pain, independence with ADLs/IADLs, increased strength, return to work and return to sport/leisure activities          Objective     Palpation   Left   Tenderness of the erector spinae and lumbar paraspinals  Right   No palpable tenderness to the erector spinae and lumbar paraspinals  Tenderness     Lumbar Spine  Tenderness in the spinous process  Left Hip   No tenderness in the PSIS  Right Hip   No tenderness in the PSIS       Active Range of Motion     Lumbar   Flexion:  WFL and with pain  Extension:  with pain Restriction level: moderate  Left lateral flexion:  with pain Restriction level: minimal  Right lateral flexion:  WFL and with pain  Left rotation:  WFL  Right rotation:  [facility]    Joint Play   Joints within functional limits: T8, T9, T10, T11, T12 and L1     Hypermobile: L2, L3, L4, L5 and S1     Pain: L2, L5 and S1     Strength/Myotome Testing     Left Hip   Planes of Motion   Flexion: 4+    Right Hip   Planes of Motion   Flexion: 4    Left Knee   Flexion: 4+  Extension: 4+    Right Knee   Flexion: 4+  Extension: 4    Additional Strength Details  Poor squat mechanics  Muscle Activation   Patient able to activate left multifidus and right multifidus  Patient unable to activate left transverse abdominals and right transverse abdominals  Additional Muscle Activation Details  Fair TA activation b/l  Poor multifidi activation b/l with pain  Tests     Lumbar   Positive prone instability   Left   Negative crossed SLR, passive SLR and slump test      Right   Negative crossed SLR, passive SLR and slump test         Evaluation performed by Reina REVELES under close supervision of Physical TherapistNisha  Precautions:  Moderate Risk on Start Back    Date 8/10            Visit # 1            FOTO 61/75             Re-eval IE              Manuals 8/10                                                                Neuro Re-Ed 8/10            TA bracing 10x10"            Supine marches             SLR w TVA 10x ea            Clams              Bird dogs 5x ea            Multifidi lifts             Pt Edu DK            Ther Ex 8/10            Bike             Leg Press             REIL 10x            Sl RDL             Curl ups             Paloff press             UTR             Pt Edu DK            Ther Activity 8/10            Squat             Lateral Walks             Step w march                          Gait Training 8/10                                      Modalities 8/10

## 2021-08-16 ENCOUNTER — OFFICE VISIT (OUTPATIENT)
Dept: PHYSICAL THERAPY | Facility: CLINIC | Age: 36
End: 2021-08-16
Payer: COMMERCIAL

## 2021-08-16 DIAGNOSIS — M54.50 ACUTE EXACERBATION OF CHRONIC LOW BACK PAIN: Primary | ICD-10-CM

## 2021-08-16 DIAGNOSIS — G89.29 ACUTE EXACERBATION OF CHRONIC LOW BACK PAIN: Primary | ICD-10-CM

## 2021-08-16 PROCEDURE — 97112 NEUROMUSCULAR REEDUCATION: CPT

## 2021-08-16 PROCEDURE — 97110 THERAPEUTIC EXERCISES: CPT

## 2021-08-16 PROCEDURE — 97161 PT EVAL LOW COMPLEX 20 MIN: CPT

## 2021-08-16 NOTE — PROGRESS NOTES
Daily Note     Today's date: 2021  Patient name: Seernity Pastrana  : 1985  MRN: [id number]  Referring provider: Tiffanie Norris, PT  Dx:   Encounter Diagnosis     ICD-10-CM    1  Acute exacerbation of chronic low back pain  M54 5     G89 29                   Subjective: Patient denies LB pain or radicular symptoms this a m  He states that he has been doing his HEP  Patient's main complaint is his R sh which he hurt during karake  Objective: See treatment diary below      Assessment: Tolerated treatment well  Patient had some difficulty with the REIL due to his R sh pain  No other complaints reported  Patient would benefit from continued PT working towards the goals as outlined in the  [address]  Plan: Continue per plan of care  Precautions:  Moderate Risk on Start Back    Date 8/10 8/16           Visit # 1 2           FOTO 61/75             Re-eval IE              Manuals 8/10 8/16                                                               Neuro Re-Ed 8/10 816           TA bracing 10x10" 10x10"           Supine marches  10x ea           SLR w TVA 10x ea 10x ea           Clams   10x ea           Bird dogs 5x ea 10x ea legs only           Multifidi lifts             Pt Edu DK            Ther Ex 8/10 8/16           Bike  10'           Leg Press             REIL 10x 10x           Sl RDL             Curl ups             Paloff press             UTR             Pt Edu DK            Ther Activity 8/10            Squat             Lateral Walks             Step w march                          Gait Training 8/10                                      Modalities 8/10

## 2021-08-18 ENCOUNTER — OFFICE VISIT (OUTPATIENT)
Dept: PHYSICAL THERAPY | Facility: CLINIC | Age: 36
End: 2021-08-18
Payer: COMMERCIAL

## 2021-08-18 DIAGNOSIS — G89.29 ACUTE EXACERBATION OF CHRONIC LOW BACK PAIN: Primary | ICD-10-CM

## 2021-08-18 DIAGNOSIS — M54.50 ACUTE EXACERBATION OF CHRONIC LOW BACK PAIN: Primary | ICD-10-CM

## 2021-08-18 PROCEDURE — 97110 THERAPEUTIC EXERCISES: CPT

## 2021-08-18 PROCEDURE — 97112 NEUROMUSCULAR REEDUCATION: CPT

## 2021-08-18 NOTE — PROGRESS NOTES
Daily Note     Today's date: 2021  Patient name: Jaylyn Messer  : 1985  MRN: [id number]  Referring provider: Shy Ross, PT  Dx:   Encounter Diagnosis     ICD-10-CM    1  Acute exacerbation of chronic low back pain  M54 5     G89 29        Start Time: 0800  Stop Time: 0830  Total time in clinic (min): 30 minutes    Subjective: Patient has no complaints of pain this morning regarding the back  Patient notes R shoulder pain  Objective: See treatment diary below    Assessment: Tolerated treatment well  Still mod difficulty with the REIL due to R shoulder pain  Able to progress reps this visit with no issue  Patient would benefit from continued PT to meet goals and return to prior level of function  Plan: Continue per plan of care  Precautions:  Moderate Risk on Start Back    Date 8/10 8/16 8/18          Visit # 1 2 3          FOTO 61/75             Re-eval IE              Manuals 8/10 8/16 8/18                                                              Neuro Re-Ed 8/10 816           TA bracing 10x10" 10x10" 10"x10          Supine marches  10x ea 15x ea          SLR w TVA 10x ea 10x ea 15x ea          Clams   10x ea 15x ea          Bird dogs 5x ea 10x ea legs only 15x ea legs only          Multifidi lifts             Pt Edu DK            Ther Ex 8/10 8/16 8/18          Bike  10' 10'          Leg Press             REIL 10x 10x 10x          Sl RDL             Curl ups             Paloff press             UTR             Pt Edu DK            Ther Activity 8/10            Squat             Lateral Walks             Step w march                          Gait Training 8/10                                      Modalities 8/10

## 2021-08-23 ENCOUNTER — OFFICE VISIT (OUTPATIENT)
Dept: PHYSICAL THERAPY | Facility: CLINIC | Age: 36
End: 2021-08-23
Payer: COMMERCIAL

## 2021-08-23 DIAGNOSIS — M54.50 ACUTE EXACERBATION OF CHRONIC LOW BACK PAIN: Primary | ICD-10-CM

## 2021-08-23 DIAGNOSIS — G89.29 ACUTE EXACERBATION OF CHRONIC LOW BACK PAIN: Primary | ICD-10-CM

## 2021-08-23 PROCEDURE — 97530 THERAPEUTIC ACTIVITIES: CPT

## 2021-08-23 PROCEDURE — 97112 NEUROMUSCULAR REEDUCATION: CPT

## 2021-08-23 PROCEDURE — 97110 THERAPEUTIC EXERCISES: CPT

## 2021-08-23 NOTE — PROGRESS NOTES
Daily Note     Today's date: 2021  Patient name: Kartik Shahid  : 1985  MRN: [id number]  Referring provider: Cipriano Gonzales, PT  Dx:   Encounter Diagnosis     ICD-10-CM    1  Acute exacerbation of chronic low back pain  M54 5     G89 29        Start Time: 1095  Stop Time: 9408  Total time in clinic (min): 40 minutes    Subjective: Pt reports his back is feeling much better  Pt states he has much less pain  Objective: See treatment diary below    Assessment: Tolerated treatment well  Pt continues to work towards goals of increased function and decreased pain  Pt needed minimal VCing for proper TVA activation  Patient would benefit from continued PT to meet goals and return to prior level of function  Continue to progress as able  Plan: Continue per plan of care  Precautions:  Moderate Risk on Start Back    Date 8/10 8/16 8/18 8/23         Visit # 1 2 3 4         FOTO 61/75             Re-eval IE              Manuals 8/10 8/16 8/18 8/23                                                             Neuro Re-Ed 8/10 816          TA bracing 10x10" 10x10" 10"x10 10"x10         Supine marches  10x ea 15x ea 20x         SLR w TVA 10x ea 10x ea 15x ea 20x ea         Clams   10x ea 15x ea 2x10 ea         Bird dogs 5x ea 10x ea legs only 15x ea legs only 10x         Multifidi lifts    nv         Pt Edu DK            Ther Ex 8/10 8/16 8/18 8/23         Bike  10' 10' 10'         Leg Press    95# 2x10         REIL 10x 10x 10x 10x         Sl RDL    15x ea         Curl ups    YMB 15x         Paloff press             UTR             Pt Edu DK            Ther Activity 8/10   8/23         Squat    20x         Lateral Walks             Step w march                          Gait Training 8/10                                      Modalities 8/10

## 2021-08-25 ENCOUNTER — OFFICE VISIT (OUTPATIENT)
Dept: PHYSICAL THERAPY | Facility: CLINIC | Age: 36
End: 2021-08-25
Payer: COMMERCIAL

## 2021-08-25 DIAGNOSIS — G89.29 ACUTE EXACERBATION OF CHRONIC LOW BACK PAIN: Primary | ICD-10-CM

## 2021-08-25 DIAGNOSIS — M54.50 ACUTE EXACERBATION OF CHRONIC LOW BACK PAIN: Primary | ICD-10-CM

## 2021-08-25 PROCEDURE — 97110 THERAPEUTIC EXERCISES: CPT | Performed by: PHYSICAL THERAPIST

## 2021-08-25 PROCEDURE — 97112 NEUROMUSCULAR REEDUCATION: CPT | Performed by: PHYSICAL THERAPIST

## 2021-08-25 NOTE — PROGRESS NOTES
Daily Note     Today's date: 2021  Patient name: Mari Petersen  : 1985  MRN: [id number]  Referring provider: Farzad Whittington, PT  Dx:   Encounter Diagnosis     ICD-10-CM    1  Acute exacerbation of chronic low back pain  M54 5     G89 29                   Subjective: Pt reports his back is feeling better today      Objective: See treatment diary below      Assessment: Pt does well w progression of core and trunk strengthening activities  He needs some cueing for form with addition of pallof press  He is challenged w KB with squatting and completes in limited depth  Patient demonstrated fatigue post treatment and would benefit from continued PT  Plan: Continue per plan of care  Progress treatment as tolerated  Precautions:  Moderate Risk on Start Back    Date 8/10 8/16 8/18 8/23 8/25        Visit # 1 2 3 4 5        FOTO      78/75        Re-eval IE              Manuals 8/10 8/16 8/18 8/23 8/25                                                            Neuro Re-Ed 8/10 816         TA bracing 10x10" 10x10" 10"x10 10"x10 10x10"        Supine marches  10x ea 15x ea 20x 20x        SLR w TVA 10x ea 10x ea 15x ea 20x ea 20x ea        Clams   10x ea 15x ea 2x10 ea 2x10 GTB ea        Bird dogs 5x ea 10x ea legs only 15x ea legs only 10x 10x        Multifidi lifts    nv         Pt Edu DK            Ther Ex 8/10 8/16 8/18 8/23 8/25        Bike  10' 10' 10' 10'        Leg Press    95# 2x10 105# 3x10        REIL 10x 10x 10x 10x 10x        Sl RDL    15x ea 15x ea        Curl ups    YMB 15x YMB 2x10        Paloff press     15x 9# w UTR        UTR             Pt Edu DK            Ther Activity 8/10   8/23 8/25        Squat    20x 20x 18# KB        Lateral Walks             Step w march                          Gait Training 8/10                                      Modalities 8/10

## 2021-08-30 ENCOUNTER — OFFICE VISIT (OUTPATIENT)
Dept: PHYSICAL THERAPY | Facility: CLINIC | Age: 36
End: 2021-08-30
Payer: COMMERCIAL

## 2021-08-30 DIAGNOSIS — G89.29 ACUTE EXACERBATION OF CHRONIC LOW BACK PAIN: Primary | ICD-10-CM

## 2021-08-30 DIAGNOSIS — M54.50 ACUTE EXACERBATION OF CHRONIC LOW BACK PAIN: Primary | ICD-10-CM

## 2021-08-30 PROCEDURE — 97112 NEUROMUSCULAR REEDUCATION: CPT | Performed by: PHYSICAL THERAPY ASSISTANT

## 2021-08-30 PROCEDURE — 97110 THERAPEUTIC EXERCISES: CPT | Performed by: PHYSICAL THERAPY ASSISTANT

## 2021-08-30 NOTE — PROGRESS NOTES
Daily Note     Today's date: 2021  Patient name: Darren Garcia  : 1985  MRN: [id number]  Referring provider: Devendra El, PT  Dx:   Encounter Diagnosis     ICD-10-CM    1  Acute exacerbation of chronic low back pain  M54 5     G89 29                   Subjective: Pt denies having any pain upon arrival to PT today  Reports he has not had any pain x 1 week  Pt reports he is still limited with heavier lifting  Objective: See treatment diary below      Assessment: Pt tolerated progression of TE as noted without difficulty  Minimal cues needed for good form with squats with KB  Patient demonstrated fatigue post treatment and would benefit from continued PT  Plan: Continue per plan of care  Progress treatment as tolerated  Precautions:  Moderate Risk on Start Back    Date 8/10 8/16 8/18 8/23 8/25 8/30       Visit # 1 2 3 4 5 6       FOTO         Re-eval IE              Manuals 8/10 8/16 8/18 8/23 8/25 8/30                                                           Neuro Re-Ed 8/10 816        TA bracing 10x10" 10x10" 10"x10 10"x10 10x10" 10"x10       Supine marches  10x ea 15x ea 20x 20x 20x       SLR w TVA 10x ea 10x ea 15x ea 20x ea 20x ea 20x ea       Clams   10x ea 15x ea 2x10 ea 2x10 GTB ea 2x10 ea blue       Bird dogs 5x ea 10x ea legs only 15x ea legs only 10x 10x 15x       Multifidi lifts    nv         Pt Edu DK            Ther Ex 8/10 8/16 8/18 8/23 8/25 8/30       Bike  10' 10' 10' 10' 10'       Leg Press    95# 2x10 105# 3x10 125#  3x10       REIL 10x 10x 10x 10x 10x 10x       Sl RDL    15x ea 15x ea 15x ea       Curl ups    YMB 15x YMB 2x10 YMB  2x10       Paloff press     15x 9# w UTR 15x 10# w/ UTR       UTR             Pt Edu DK            Ther Activity 8/10   8/23 8/25 8/30       Squat    20x 20x 18# KB 20x 18#  KB       Lateral Walks             Step w march                          Gait Training 8/10 Modalities 8/10

## 2021-09-01 ENCOUNTER — OFFICE VISIT (OUTPATIENT)
Dept: PHYSICAL THERAPY | Facility: CLINIC | Age: 36
End: 2021-09-01
Payer: COMMERCIAL

## 2021-09-01 DIAGNOSIS — M54.50 ACUTE EXACERBATION OF CHRONIC LOW BACK PAIN: Primary | ICD-10-CM

## 2021-09-01 DIAGNOSIS — G89.29 ACUTE EXACERBATION OF CHRONIC LOW BACK PAIN: Primary | ICD-10-CM

## 2021-09-01 PROCEDURE — 97110 THERAPEUTIC EXERCISES: CPT | Performed by: PHYSICAL THERAPIST

## 2021-09-01 PROCEDURE — 97112 NEUROMUSCULAR REEDUCATION: CPT | Performed by: PHYSICAL THERAPIST

## 2021-09-01 PROCEDURE — 97530 THERAPEUTIC ACTIVITIES: CPT | Performed by: PHYSICAL THERAPIST

## 2021-09-01 NOTE — PROGRESS NOTES
Daily Note     Today's date: 2021  Patient name: Joanne Norwood  : 1985  MRN: [id number]  Referring provider: Angelina Spears PT  Dx:   Encounter Diagnosis     ICD-10-CM    1  Acute exacerbation of chronic low back pain  M54 5     G89 29        Start Time: 0800  Stop Time: 0850  Total time in clinic (min): 50 minutes    Subjective: Patient reports continued improvement in low back pain but has not "pushed it "      Objective: See treatment diary below      Assessment: Patient tolerated treatment well with no aggravation of sx reported following today's session  In addition, he was able to initiate sumo squats to progress functional lifting while maintaining good technique  Progress, as tolerated  Plan: Continue per plan of care  Precautions:  Moderate Risk on Start Back    Date 8/10 8/16 8/18 8/23 8/25 8/30 9/1      Visit # 1 2 3 4 5 6 7      FOTO 61/75     78/75        Re-eval IE              Manuals 8/10 8/16 8/18 8/23 8/25 8/30 9/1                                                          Neuro Re-Ed 8/10 816       TA bracing 10x10" 10x10" 10"x10 10"x10 10x10" 10"x10 D/c      Supine marches  10x ea 15x ea 20x 20x 20x 20x      SLR w TVA 10x ea 10x ea 15x ea 20x ea 20x ea 20x ea 20x ea      Clams   10x ea 15x ea 2x10 ea 2x10 GTB ea 2x10 ea blue 2x10 ea blue      Bird dogs 5x ea 10x ea legs only 15x ea legs only 10x 10x 15x 15x ea      Multifidi lifts    nv         Pt Edu DK            Ther Ex 8/10 8/16 8/18 8/23 8/25 8/30 9/1      Bike  10' 10' 10' 10' 10' 10'      Leg Press    95# 2x10 105# 3x10 125#  3x10 125# 2x15 135# 1x15      REIL 10x 10x 10x 10x 10x 10x 15x      Sl RDL    15x ea 15x ea 15x ea nv      Curl ups    YMB 15x YMB 2x10 YMB  2x10 YMB 2x15      Paloff press     15x 9# w UTR 15x 10# w/ UTR 2x15 10# w/ UTR      UTR             Pt Edu DK            Ther Activity 8/10   8/23 8/25 8/30 9/1      Squat    20x 20x 18# KB 20x 18#  KB 2x15 18# KB Lateral Walks             Step w march             [facility] squat       35# kb 2x15      Gait Training 8/10                                      Modalities 8/10

## 2021-09-08 ENCOUNTER — OFFICE VISIT (OUTPATIENT)
Dept: PHYSICAL THERAPY | Facility: CLINIC | Age: 36
End: 2021-09-08
Payer: COMMERCIAL

## 2021-09-08 DIAGNOSIS — G89.29 ACUTE EXACERBATION OF CHRONIC LOW BACK PAIN: Primary | ICD-10-CM

## 2021-09-08 DIAGNOSIS — M54.50 ACUTE EXACERBATION OF CHRONIC LOW BACK PAIN: Primary | ICD-10-CM

## 2021-09-08 PROCEDURE — 97110 THERAPEUTIC EXERCISES: CPT | Performed by: PHYSICAL THERAPIST

## 2021-09-08 PROCEDURE — 97112 NEUROMUSCULAR REEDUCATION: CPT | Performed by: PHYSICAL THERAPIST

## 2021-09-08 NOTE — PROGRESS NOTES
Discharge Note     Today's date: 2021  Patient name: Abby Sykes  : 1985  MRN: [id number]  Referring provider: Remi Farmer, PT  Dx:   Encounter Diagnosis     ICD-10-CM    1  Acute exacerbation of chronic low back pain  M54 5     G89 29                   Subjective: Pt reports no pain and continued improvement in strength w his HEP  Objective: See treatment diary below      Assessment: Pt does well w addition of core strengthening activities and planks  He demonstrates good technique and has no functional deficits at this point  Patient demonstrated fatigue post treatment and exhibited good technique with therapeutic exercises  Pt is being DC'd w updated HEP  Plan: DC w HEP  Precautions:  Moderate Risk on Start Back    Date 8/10 8/16 8/18 8/23 8/25 8/30 9/1 9/8     Visit # 1 2 3 4 5 6 7 8     FOTO      78        Re-eval IE       DC       Manuals 8/10 8/16 8/18 8/23 8/25 8/30 9/1 9/8                                                         Neuro Re-Ed 8/10 816      TA bracing 10x10" 10x10" 10"x10 10"x10 10x10" 10"x10 D/c      Supine marches  10x ea 15x ea 20x 20x 20x 20x      SLR w TVA 10x ea 10x ea 15x ea 20x ea 20x ea 20x ea 20x ea      Clams   10x ea 15x ea 2x10 ea 2x10 GTB ea 2x10 ea blue 2x10 ea blue      Bird dogs 5x ea 10x ea legs only 15x ea legs only 10x 10x 15x 15x ea 2x10     SL Bridges        2x10     Multifidi lifts    nv         Pt Edu DK       SF     Ther Ex 8/10 8/16 8/18 8/23 8/25 8/30 9/1 9/8     Bike  10' 10' 10' 10' 10' 10' 10'     Leg Press    95# 2x10 105# 3x10 125#  3x10 125# 2x15 135# 1x15 3x12 135#     REIL 10x 10x 10x 10x 10x 10x 15x      Sl RDL    15x ea 15x ea 15x ea nv 15x 18# KB     Curl ups    YMB 15x YMB 2x10 YMB  2x10 YMB 2x15      Paloff press     15x 9# w UTR 15x 10# w/ UTR 2x15 10# w/ UTR 2x10 12# w/ UTR     Planks        3x30" front and sides     Pt Edu DK            Ther Activity 8/10   8/23 8/25 8/30 9/1 9/8 Squat    20x 20x 18# KB 20x 18#  KB 2x15 18# KB      Lateral Walks             Step w march Sumo squat       35# kb 2x15 35# kb 2x15     Gait Training 8/10                                      Modalities 8/10

## 2021-10-05 ENCOUNTER — OFFICE VISIT (OUTPATIENT)
Dept: FAMILY MEDICINE CLINIC | Facility: CLINIC | Age: 36
End: 2021-10-05

## 2021-10-05 VITALS
DIASTOLIC BLOOD PRESSURE: 110 MMHG | OXYGEN SATURATION: 97 % | TEMPERATURE: 97.6 F | RESPIRATION RATE: 18 BRPM | SYSTOLIC BLOOD PRESSURE: 148 MMHG | BODY MASS INDEX: 33.5 KG/M2 | WEIGHT: 234 LBS | HEIGHT: 70 IN | HEART RATE: 78 BPM

## 2021-10-05 DIAGNOSIS — I10 PRIMARY HYPERTENSION: Primary | ICD-10-CM

## 2021-10-05 PROCEDURE — 99243 OFF/OP CNSLTJ NEW/EST LOW 30: CPT | Performed by: NURSE PRACTITIONER

## 2021-10-05 RX ORDER — LISINOPRIL 40 MG/1
40 TABLET ORAL DAILY
Qty: 90 TABLET | Refills: 3 | Status: SHIPPED | OUTPATIENT
Start: 2021-10-05

## 2021-10-08 ENCOUNTER — CLINICAL SUPPORT (OUTPATIENT)
Dept: FAMILY MEDICINE CLINIC | Facility: CLINIC | Age: 36
End: 2021-10-08

## 2021-10-08 VITALS — HEART RATE: 73 BPM | DIASTOLIC BLOOD PRESSURE: 84 MMHG | SYSTOLIC BLOOD PRESSURE: 118 MMHG

## 2021-10-08 DIAGNOSIS — I10 PRIMARY HYPERTENSION: Primary | ICD-10-CM

## 2021-10-13 ENCOUNTER — TELEPHONE (OUTPATIENT)
Dept: FAMILY MEDICINE CLINIC | Facility: CLINIC | Age: 36
End: 2021-10-13

## 2021-10-13 PROBLEM — I10 PRIMARY HYPERTENSION: Status: ACTIVE | Noted: 2021-10-13

## 2021-11-17 ENCOUNTER — VBI (OUTPATIENT)
Dept: ADMINISTRATIVE | Facility: OTHER | Age: 36
End: 2021-11-17

## 2023-07-06 ENCOUNTER — TELEPHONE (OUTPATIENT)
Dept: FAMILY MEDICINE CLINIC | Facility: CLINIC | Age: 38
End: 2023-07-06

## 2023-07-06 NOTE — TELEPHONE ENCOUNTER
Please schedule TCM appointment. Verify how patient is feeling and if they are in need of anything before their visit. Please send appt date and patient questions/concerns to Janice to complete TCM.

## 2024-05-06 ENCOUNTER — OFFICE VISIT (OUTPATIENT)
Dept: FAMILY MEDICINE CLINIC | Facility: CLINIC | Age: 39
End: 2024-05-06

## 2024-05-06 VITALS
TEMPERATURE: 97.8 F | HEART RATE: 86 BPM | SYSTOLIC BLOOD PRESSURE: 152 MMHG | OXYGEN SATURATION: 97 % | BODY MASS INDEX: 39.65 KG/M2 | WEIGHT: 267.7 LBS | DIASTOLIC BLOOD PRESSURE: 109 MMHG | RESPIRATION RATE: 18 BRPM | HEIGHT: 69 IN

## 2024-05-06 DIAGNOSIS — I10 PRIMARY HYPERTENSION: ICD-10-CM

## 2024-05-06 DIAGNOSIS — H93.13 TINNITUS OF BOTH EARS: ICD-10-CM

## 2024-05-06 DIAGNOSIS — I50.20 HFREF (HEART FAILURE WITH REDUCED EJECTION FRACTION) (HCC): ICD-10-CM

## 2024-05-06 DIAGNOSIS — I42.8 NICM (NONISCHEMIC CARDIOMYOPATHY) (HCC): Primary | ICD-10-CM

## 2024-05-06 PROBLEM — M54.50 ACUTE LEFT-SIDED LOW BACK PAIN WITHOUT SCIATICA: Status: RESOLVED | Noted: 2021-07-30 | Resolved: 2024-05-06

## 2024-05-06 PROCEDURE — 99214 OFFICE O/P EST MOD 30 MIN: CPT | Performed by: FAMILY MEDICINE

## 2024-05-06 RX ORDER — FUROSEMIDE 40 MG/1
40 TABLET ORAL AS NEEDED
COMMUNITY
Start: 2024-01-30 | End: 2025-01-29

## 2024-05-06 RX ORDER — SPIRONOLACTONE 25 MG/1
25 TABLET ORAL DAILY
COMMUNITY

## 2024-05-06 RX ORDER — VALSARTAN 160 MG/1
160 TABLET ORAL DAILY
COMMUNITY

## 2024-05-06 RX ORDER — CARVEDILOL 12.5 MG/1
12.5 TABLET ORAL 2 TIMES DAILY WITH MEALS
COMMUNITY

## 2024-05-06 NOTE — PROGRESS NOTES
Name: Eddy Rios      : 1985      MRN: [id number]  Encounter Provider: Brennan Fernando MD  Encounter Date: 2024   Encounter department: [facility]    Assessment & Plan     1. NICM (nonischemic cardiomyopathy) ([facility])  Assessment & Plan:  Unknown etiology  Ejection fraction of 20% from his recent echocardiogram  He is asymptomatic  Continue spironolactone, Coreg, Jardiance, Lasix, and valsartan  Follow-up with cardiology outpatient      2. HFrEF (heart failure with reduced ejection fraction) ([facility])  Assessment & Plan:  Wt Readings from Last 3 Encounters:   24 121 kg (267 lb 11.2 oz)   10/05/21 106 kg (234 lb)   21 112 kg (247 lb)   Clinically euvolemic  Continue follow-up with cardiology outpatient  Continue spironolactone, Lasix, valsartan, Jardiance and Coreg  Patient refuses AICD.  Discussed with patient the risk of arrhythmia given his low ejection fraction  Follow with cardiology outpatient              3. Primary hypertension  Assessment & Plan:  Blood pressure elevated today  Blood pressure goal is 140/90  Patient reports that he just took his blood pressure medication shortly before coming to the clinic  Continue Coreg, Jardiance, spironolactone, and valsartan  Recommend ambulatory blood pressure monitoring        4. Tinnitus of both ears  -     Ambulatory Referral to Otolaryngology; Future           Subjective      39-year-old male with a history of nonischemic cardiomyopathy and hypertension who presents today for bilateral tinnitus.  His symptoms started in 2023.  He denies any hearing loss.  His symptoms have gotten progressively worse the past couple of days.  He is requesting ENT referral.      Review of Systems   Constitutional:  Negative for activity change, appetite change, chills, diaphoresis, fatigue and fever.   HENT:  Positive for tinnitus. Negative for congestion, ear pain, facial swelling, hearing loss, sore  "throat and voice change.    Eyes:  Negative for visual disturbance.   Respiratory:  Negative for cough, shortness of breath and wheezing.    Cardiovascular:  Negative for chest pain, palpitations and leg swelling.   Gastrointestinal:  Negative for abdominal pain, nausea and vomiting.   Genitourinary:  Negative for dysuria, hematuria and urgency.   Musculoskeletal:  Negative for arthralgias.   Neurological:  Negative for dizziness, seizures, syncope, light-headedness and headaches.       Current Outpatient Medications on File Prior to Visit   Medication Sig    Empagliflozin (JARDIANCE) 10 MG TABS tablet Take 10 mg by mouth daily    furosemide (LASIX) 40 mg tablet Take 40 mg by mouth if needed (weight gain >3 lbs or leg sweling)    carvedilol (COREG) 12.5 mg tablet Take 12.5 mg by mouth 2 (two) times a day with meals    spironolactone (ALDACTONE) 25 mg tablet Take 25 mg by mouth daily    valsartan (DIOVAN) 160 mg tablet Take 160 mg by mouth daily    [DISCONTINUED] chlorthalidone 25 mg tablet Take 25 mg by mouth daily (Patient not taking: Reported on 7/30/2021)    [DISCONTINUED] lisinopril (ZESTRIL) 40 mg tablet Take 1 tablet (40 mg total) by mouth daily    [DISCONTINUED] phentermine 37.5 MG capsule Take 37.5 mg by mouth every morning (Patient not taking: Reported on 10/5/2021)    [DISCONTINUED] topiramate (TOPAMAX) 25 mg tablet Take 25 mg by mouth 2 (two) times a day (Patient not taking: Reported on 10/5/2021)       Objective     BP (!) 152/109 (BP Location: Left arm, Patient Position: Sitting, Cuff Size: Large)   Pulse 86   Temp 97.8 °F (36.6 °C) (Temporal)   Resp 18   Ht 5' 9\" (1.753 m)   Wt 121 kg (267 lb 11.2 oz)   SpO2 97%   BMI 39.53 kg/m²     Physical Exam  Constitutional:       General: He is not in acute distress.     Appearance: Normal appearance. He is well-developed. He is obese. He is not ill-appearing, toxic-appearing or diaphoretic.   HENT:      Head: Normocephalic and atraumatic.      Right Ear: " Tympanic membrane, ear canal and external ear normal. There is no impacted cerumen.      Left Ear: Tympanic membrane, ear canal and external ear normal. There is no impacted cerumen.      Nose: Nose normal. No congestion or rhinorrhea.      Mouth/Throat:      Pharynx: No oropharyngeal exudate.   Eyes:      General: No scleral icterus.        Right eye: No discharge.         Left eye: No discharge.      Extraocular Movements: Extraocular movements intact.      Conjunctiva/sclera: Conjunctivae normal.      Pupils: Pupils are equal, round, and reactive to light.   Cardiovascular:      Rate and Rhythm: Normal rate and regular rhythm.      Heart sounds: Normal heart sounds. No murmur heard.     No friction rub. No gallop.   Pulmonary:      Effort: Pulmonary effort is normal. No respiratory distress.      Breath sounds: Normal breath sounds. No stridor. No wheezing.   Abdominal:      General: Bowel sounds are normal. There is no distension.      Palpations: Abdomen is soft.      Tenderness: There is no abdominal tenderness. There is no guarding.   Musculoskeletal:         General: Normal range of motion.      Cervical back: Normal range of motion.      Right lower leg: No edema.      Left lower leg: No edema.   Skin:     General: Skin is warm.      Capillary Refill: Capillary refill takes less than 2 seconds.   Neurological:      Mental Status: He is alert and oriented to person, place, and time.      Cranial Nerves: No cranial nerve deficit.      Motor: No weakness.      Gait: Gait normal.   Psychiatric:         Mood and Affect: Mood normal.       Brennan Fernando MD

## 2024-05-06 NOTE — ASSESSMENT & PLAN NOTE
Wt Readings from Last 3 Encounters:   05/06/24 121 kg (267 lb 11.2 oz)   10/05/21 106 kg (234 lb)   07/30/21 112 kg (247 lb)   Clinically euvolemic  Continue follow-up with cardiology outpatient  Continue spironolactone, Lasix, valsartan, Jardiance and Coreg  Patient refuses AICD.  Discussed with patient the risk of arrhythmia given his low ejection fraction  Follow with cardiology outpatient

## 2024-05-06 NOTE — ASSESSMENT & PLAN NOTE
Unknown etiology  Ejection fraction of 20% from his recent echocardiogram  He is asymptomatic  Continue spironolactone, Coreg, Jardiance, Lasix, and valsartan  Follow-up with cardiology outpatient

## 2024-05-06 NOTE — ASSESSMENT & PLAN NOTE
Blood pressure elevated today  Blood pressure goal is 140/90  Patient reports that he just took his blood pressure medication shortly before coming to the clinic  Continue Coreg, Jardiance, spironolactone, and valsartan  Recommend ambulatory blood pressure monitoring

## 2024-09-24 NOTE — PROGRESS NOTES
[address] [facility]    NAME: Georgia Ramirez  AGE: 31 y o  SEX: male  : 1985     DATE: 2021     Assessment and Plan:     Problem List Items Addressed This Visit        Other    Acute left-sided low back pain without sciatica       Leg raise test negative, no radiculopathy  Likely mechanical overuse injury  Requires physical therapy  No indication for imaging at this point  Referral to comp spine  Relevant Orders    Ambulatory Referral to [facility]    Ambulatory referral to [facility]    BMI 35 0-35 9,adult     BMI above normal  Counseling and plan as documented below   -Pt acknowledges understanding and agrees to lifestyle changes   -Additional handouts on diet/exercise provided   -Will monitor progress at next scheduled follow-up  -  Continue weight loss  Relevant Orders    Lipid panel    Comprehensive metabolic panel    CBC and Platelet    Vitamin D 25 hydroxy      Other Visit Diagnoses     Annual physical exam    -  Primary    Encounter to establish care              Immunizations and preventive care screenings were discussed with patient today  Appropriate education was printed on patient's after visit summary  Counseling:  Alcohol/drug use: discussed moderation in alcohol intake, the recommendations for healthy alcohol use, and avoidance of illicit drug use  Dental Health: discussed importance of regular tooth brushing, flossing, and dental visits  Injury prevention: discussed safety/seat belts, safety helmets, smoke detectors, carbon dioxide detectors, and smoking near bedding or upholstery  Sexual health: discussed sexually transmitted diseases, partner selection, use of condoms, avoidance of unintended pregnancy, and contraceptive alternatives  · Exercise: the importance of regular exercise/physical activity was discussed   Recommend exercise 3-5 times per week for at least 30 minutes  Return in about 1 week (around 8/6/2021) for nurse visit for BP check, then appt for 6 months f/u hypertension  Chief Complaint:     Chief Complaint   Patient presents with   [address]     NP est care     Back Pain     would like to see cariopractor       History of Present Illness:     Adult Annual Physical   Patient here for a comprehensive physical examAnd to establish care with PCP  Patient presents today with his wife  Patient states his only health concern right now is left-sided low back pain  He does believe this is related to an injury years ago when he was helping his mother to move, he was lifting heavy furniture and boxes  He also states he works in [employer] where in he needs to lift heavy objects repeatedly for many hours  He takes over-the-counter analgesics for relief  He has never had physical therapy or imaging for his back  He also states he has a history of high blood pressure but was never consistently treated for it  He has been trying to lose weight through diet and exercise and has lost 33 lb since January, 7 months ago  He declines the COVID vaccine today  He routinely gets eye exams and wears glasses  He has a dental appointment next month  He states he has a family history of hypertension and diabetes  Diet and Physical Activity  · Diet/Nutrition: well balanced diet  · Exercise: 3-4 times a week on average  Depression Screening  PHQ-9 Depression Screening    PHQ-9:   Frequency of the following problems over the past two weeks:      Little interest or pleasure in doing things: 0 - not at all  Feeling down, depressed, or hopeless: 0 - not at all  PHQ-2 Score: 0       General Health  · Sleep: sleeps well  · Hearing: normal - bilateral   · Vision: goes for regular eye exams and wears glasses  · Dental: regular dental visits         [facility]  · History of STDs?: no      Review of Systems:     Review of Systems Constitutional: Positive for diaphoresis  Negative for activity change, chills, fatigue, fever and unexpected weight change  HENT: Negative for congestion, ear pain, hearing loss, rhinorrhea, sinus pressure and sore throat  Eyes: Negative for pain and visual disturbance  Respiratory: Negative for cough, shortness of breath and wheezing  Cardiovascular: Negative for chest pain, palpitations and leg swelling  Gastrointestinal: Negative for abdominal pain, nausea and vomiting  Genitourinary: Negative for dysuria and hematuria  Musculoskeletal: Positive for back pain  Negative for arthralgias, gait problem, joint swelling and myalgias  Skin: Negative for color change and rash  Neurological: Negative for dizziness, tremors, seizures, syncope, weakness, light-headedness and headaches  Psychiatric/Behavioral: Negative for agitation, behavioral problems, confusion, dysphoric mood, self-injury, sleep disturbance and suicidal ideas  The patient is not nervous/anxious  All other systems reviewed and are negative       Past Medical History:     Past Medical History:   Diagnosis Date    Hypertension       Past Surgical History:     Past Surgical History:   Procedure Laterality Date    VASECTOMY      2019      Social History:     Social History     Socioeconomic History    Marital status: Single     Spouse name: None    Number of children: None    Years of education: None    Highest education level: None   Occupational History    None   Tobacco Use    Smoking status: Never Smoker    Smokeless tobacco: Never Used   Vaping Use    Vaping Use: Never used   Substance and Sexual Activity    Alcohol use: Never    Drug use: Never    Sexual activity: None   Other Topics Concern    None   Social History Narrative    None     Social Determinants of Health     Financial Resource Strain:     Difficulty of Paying Living Expenses:    Food Insecurity:     Worried About Running Out of Food in the Last Year:     Ran Out of Food in the Last Year:    Transportation Needs:     Lack of Transportation (Medical):  Lack of Transportation (Non-Medical):    Physical Activity:     Days of Exercise per Week:     Minutes of Exercise per Session:    Stress:     Feeling of Stress :    Social Connections:     Frequency of Communication with Friends and Family:     Frequency of Social Gatherings with Friends and Family:     Attends L[employer] day Services:     Active Member of Clubs or Organizations:     Attends Club or Organization Meetings:     Marital Status:    Intimate Partner Violence:     Fear of Current or Ex-Partner:     Emotionally Abused:     Physically Abused:     Sexually Abused:       Family History:     History reviewed  No pertinent family history  Current Medications:     Current Outpatient Medications   Medication Sig Dispense Refill    phentermine 37 5 MG capsule Take 37 5 mg by mouth every morning      topiramate (TOPAMAX) 25 mg tablet Take 25 mg by mouth 2 (two) times a day      chlorthalidone 25 mg tablet Take 25 mg by mouth daily (Patient not taking: Reported on 7/30/2021)      Cholecalciferol (Vitamin D3) 1 25 MG (38698 UT) CAPS Take 1 capsule by mouth once a week (Patient not taking: Reported on 7/30/2021)      lisinopril (ZESTRIL) 20 mg tablet Take 20 mg by mouth daily (Patient not taking: Reported on 7/30/2021)       No current facility-administered medications for this visit  Allergies: Allergies   Allergen Reactions    Aspirin Rash      Physical Exam:     /90 (BP Location: Left arm, Patient Position: Sitting, Cuff Size: Standard)   Pulse 86   Temp (!) 96 9 °F (36 1 °C) (Temporal)   Resp 16   Ht 5' 10" (1 778 m)   Wt 112 kg (247 lb)   SpO2 98%   BMI 35 44 kg/m²     Physical Exam  Vitals and nursing note reviewed  Constitutional:       Appearance: Normal appearance  He is well-developed  He is diaphoretic  HENT:      Head: Normocephalic and atraumatic  Right Ear: Tympanic membrane, ear canal and external ear normal  There is no impacted cerumen  Left Ear: Tympanic membrane, ear canal and external ear normal  There is no impacted cerumen  Nose: Nose normal       Mouth/Throat:      Mouth: Mucous membranes are moist    Eyes:      Extraocular Movements: Extraocular movements intact  Conjunctiva/sclera: Conjunctivae normal       Pupils: Pupils are equal, round, and reactive to light  Cardiovascular:      Rate and Rhythm: Normal rate and regular rhythm  Pulses: Normal pulses  Heart sounds: Normal heart sounds  No murmur heard  Pulmonary:      Effort: Pulmonary effort is normal  No respiratory distress  Breath sounds: Normal breath sounds  Abdominal:      Palpations: Abdomen is soft  Tenderness: There is no abdominal tenderness  Musculoskeletal:         General: Normal range of motion  Cervical back: Normal range of motion and neck supple  Lumbar back: No swelling, deformity or spasms  Normal range of motion  Comments:   Right-sided lumbar back pain  Leg raise test negative for evidence of herniated disc  Denies radiculopathy  Skin:     General: Skin is warm  Capillary Refill: Capillary refill takes less than 2 seconds  Neurological:      General: No focal deficit present  Mental Status: He is alert and oriented to person, place, and time     Psychiatric:         Mood and Affect: Mood normal          Behavior: Behavior normal           Alberta Garcia 36 Detail Level: Detailed Size Of Lesion: 6 mm x 3 mm

## 2024-09-26 ENCOUNTER — TELEPHONE (OUTPATIENT)
Dept: FAMILY MEDICINE CLINIC | Facility: CLINIC | Age: 39
End: 2024-09-26

## 2024-09-27 NOTE — TELEPHONE ENCOUNTER
first attempt to contact patient. left message to return my call on answering machine. If pt calls back please assist with scheduling appt.

## 2024-10-01 NOTE — TELEPHONE ENCOUNTER
third attempt to contact patient. left message to return my call on answering machine.    Letter being sent.